# Patient Record
Sex: FEMALE | Race: WHITE | NOT HISPANIC OR LATINO | URBAN - METROPOLITAN AREA
[De-identification: names, ages, dates, MRNs, and addresses within clinical notes are randomized per-mention and may not be internally consistent; named-entity substitution may affect disease eponyms.]

---

## 2021-04-20 ENCOUNTER — TELEPHONE ENCOUNTER (OUTPATIENT)
Dept: URBAN - METROPOLITAN AREA CLINIC 12 | Facility: CLINIC | Age: 76
End: 2021-04-20

## 2021-06-08 ENCOUNTER — OFFICE VISIT (OUTPATIENT)
Dept: URBAN - METROPOLITAN AREA CLINIC 12 | Facility: CLINIC | Age: 76
End: 2021-06-08
Payer: MEDICARE

## 2021-06-08 ENCOUNTER — OFFICE VISIT (OUTPATIENT)
Dept: URBAN - METROPOLITAN AREA CLINIC 12 | Facility: CLINIC | Age: 76
End: 2021-06-08

## 2021-06-08 DIAGNOSIS — F43.9 STRESS: ICD-10-CM

## 2021-06-08 DIAGNOSIS — Z86.010 HISTORY OF ADENOMATOUS POLYP OF COLON: ICD-10-CM

## 2021-06-08 DIAGNOSIS — K57.30 DIVERTICULA OF COLON: ICD-10-CM

## 2021-06-08 DIAGNOSIS — D69.3 CHRONIC ITP (IDIOPATHIC THROMBOCYTOPENIC PURPURA): ICD-10-CM

## 2021-06-08 DIAGNOSIS — K30 ACID INDIGESTION: ICD-10-CM

## 2021-06-08 DIAGNOSIS — D47.2 MGUS (MONOCLONAL GAMMOPATHY OF UNKNOWN SIGNIFICANCE): ICD-10-CM

## 2021-06-08 PROBLEM — 277577000: Status: ACTIVE | Noted: 2021-06-08

## 2021-06-08 PROCEDURE — 99203 OFFICE O/P NEW LOW 30 MIN: CPT | Performed by: INTERNAL MEDICINE

## 2021-06-08 RX ORDER — MAGNESIUM 30 MG
TABLET ORAL
Qty: 0 | Refills: 0 | Status: DISCONTINUED | COMMUNITY
Start: 1900-01-01

## 2021-06-08 RX ORDER — MILK THISTLE 150 MG
CAPSULE ORAL
Qty: 0 | Refills: 0 | Status: DISCONTINUED | COMMUNITY
Start: 1900-01-01

## 2021-06-08 NOTE — HPI-TODAY'S VISIT:
75-year-old  man presents for GI evaluation.   She was last seen in the office in 2017. She has a history of negative colonoscopy on January 27, 2016 except for diverticulosis. Prior to that she did have a history of adenomatous colon polyps.   Patient does see Dr. Zara reis of hematology and also for primary care due to her history of MGUS, ITP and chronically elevated white blood cell count as she has been getting worked up for possible CLL which initially tested negative she states with flow cytometry.   Her last labs were done on June 7, 2021 which showed WBC of 15.3, hemoglobin 12.4 and platelet count at 50. She has had a lot of bruising but no bleeding.  She states that she has also seen a urologist Dr. Morro Suarez For workup of urinary issues including hematuria and pneumaturia. Apparently the workup has been thus far negative. She also sees a rheumatologist for possible arthritis.   Patient does mention that she has had more stress recently obviously during a stressful time during the pandemic in particular.  She previously had indigestion and heartburn in was taking proton-pump inhibitor her symptoms have resolved. She also was treated for H. pylori and did have diarrhea but that has resolved in bowel habits are now normal. H. pylori has been eradicated.  There's been no melena hematochezia or abodminal pain. There has been no fever chills or sweats or nausea vomiting. She did not get the COVID-19 vaccinations.

## 2021-07-02 ENCOUNTER — TELEPHONE ENCOUNTER (OUTPATIENT)
Dept: URBAN - METROPOLITAN AREA CLINIC 12 | Facility: CLINIC | Age: 76
End: 2021-07-02

## 2021-08-30 ENCOUNTER — TELEPHONE ENCOUNTER (OUTPATIENT)
Dept: URBAN - METROPOLITAN AREA CLINIC 12 | Facility: CLINIC | Age: 76
End: 2021-08-30

## 2021-11-12 ENCOUNTER — OFFICE VISIT (OUTPATIENT)
Dept: URBAN - METROPOLITAN AREA CLINIC 12 | Facility: CLINIC | Age: 76
End: 2021-11-12
Payer: MEDICARE

## 2021-11-12 ENCOUNTER — DASHBOARD ENCOUNTERS (OUTPATIENT)
Age: 76
End: 2021-11-12

## 2021-11-12 DIAGNOSIS — K30 ACID INDIGESTION: ICD-10-CM

## 2021-11-12 DIAGNOSIS — D69.3 CHRONIC ITP (IDIOPATHIC THROMBOCYTOPENIC PURPURA): ICD-10-CM

## 2021-11-12 DIAGNOSIS — Z86.010 HISTORY OF ADENOMATOUS POLYP OF COLON: ICD-10-CM

## 2021-11-12 DIAGNOSIS — F43.9 STRESS: ICD-10-CM

## 2021-11-12 DIAGNOSIS — K57.30 DIVERTICULA OF COLON: ICD-10-CM

## 2021-11-12 DIAGNOSIS — Z86.19 HISTORY OF HELICOBACTER PYLORI INFECTION: ICD-10-CM

## 2021-11-12 DIAGNOSIS — R63.4 WEIGHT LOSS: ICD-10-CM

## 2021-11-12 PROBLEM — 733657002: Status: ACTIVE | Noted: 2021-06-08

## 2021-11-12 PROBLEM — 73595000: Status: ACTIVE | Noted: 2021-06-08

## 2021-11-12 PROBLEM — 15627741000119108: Status: ACTIVE | Noted: 2021-11-12

## 2021-11-12 PROBLEM — 13172003: Status: ACTIVE | Noted: 2021-06-08

## 2021-11-12 PROCEDURE — 99214 OFFICE O/P EST MOD 30 MIN: CPT | Performed by: INTERNAL MEDICINE

## 2021-11-12 RX ORDER — SODIUM, POTASSIUM,MAG SULFATES 17.5-3.13G
344MLL AS DIRECTED SOLUTION, RECONSTITUTED, ORAL ORAL
Qty: 1 KIT | Refills: 0 | OUTPATIENT
Start: 2021-11-12 | End: 2021-11-14

## 2021-11-12 NOTE — HPI-TODAY'S VISIT:
This is a 76-year-old woman presents for evaluation. She sees Dr. Noemi Bernal for primary care and Dr. Zara Reis for hematology. She was last seen here in the clinic on June 8, 2021. At that point her platelet count was quite low and she was advised to have a CT colonography to evaluate for possible polyp. 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   She states that she has been under lot of stress and has had weight loss. There has been no abdominal pain. There's been no fever chills or sweats. There's been no change in bowel habits. She states that sometimes when she is stressed she will have some changes by other times when she is not stressed bowel habits are completely normal. This been a majority of her adult lifetime. There's been no pruritus or jaundice. There has been no melena or hematochezia. There's been no nausea or vomiting. There's been no chest pain or shortness of breath or palpitations. She did not want to get a CT colonography done that was ordered in June of 2021 as she was having a bunch of other issues at that time.  She also states that she feels she would like to have an H. pylori rechecked as she does have a history of H. pylori in the past. Of note it was treated and then confirmed to be eradicated on follow up testing.  Patient recently had a bone marrow biopsy by her hematologist Dr. Dania reis. Patient stated she was told she may have CLL. She apparently no longer has MGUS.    Patient weight was 126.8 pounds here in the clinic in June of 2021. Today she is at 125 pounds.  She previously had problems with acid indigestion. States that has actually improved and only takes Pepcid on rare occasion. There's been no dysphagia or odynophagia. No early satiety.  Patient does have a history of adenomatous colon polyp noted in the past on colonoscopy and also has history of diverticulosis.  She was advised to have COVID-19 vaccination when I last saw her but she has not done this yet.  She is on prednisone 5 mg per day right now through her hematologist. I have reviewed her recent laboratory tests which were collected on November 1, 2021. These showed WBC 8.4, hemoglobin 14.8, platelets 131.

## 2021-11-23 LAB — H. PYLORI STOOL AG, EIA: NEGATIVE

## 2021-12-27 PROBLEM — 162031009: Status: ACTIVE | Noted: 2021-06-08

## 2021-12-27 PROBLEM — 429047008: Status: ACTIVE | Noted: 2021-06-08

## 2022-02-11 ENCOUNTER — TELEPHONE ENCOUNTER (OUTPATIENT)
Dept: URBAN - METROPOLITAN AREA CLINIC 12 | Facility: CLINIC | Age: 77
End: 2022-02-11

## 2022-02-21 ENCOUNTER — OFFICE VISIT (OUTPATIENT)
Dept: URBAN - METROPOLITAN AREA LAB 3 | Facility: LAB | Age: 77
End: 2022-02-21

## 2022-02-21 ENCOUNTER — CLAIMS CREATED FROM THE CLAIM WINDOW (OUTPATIENT)
Dept: URBAN - METROPOLITAN AREA LAB 3 | Facility: LAB | Age: 77
End: 2022-02-21
Payer: MEDICARE

## 2022-02-21 ENCOUNTER — CLAIMS CREATED FROM THE CLAIM WINDOW (OUTPATIENT)
Dept: URBAN - METROPOLITAN AREA LAB 3 | Facility: LAB | Age: 77
End: 2022-02-21

## 2022-02-21 DIAGNOSIS — R12 BURNING REFLUX: ICD-10-CM

## 2022-02-21 DIAGNOSIS — K31.89 ACQUIRED DEFORMITY OF DUODENUM: ICD-10-CM

## 2022-02-21 DIAGNOSIS — R11.0 AM NAUSEA: ICD-10-CM

## 2022-02-21 DIAGNOSIS — Z86.010 ADENOMAS PERSONAL HISTORY OF COLONIC POLYPS: ICD-10-CM

## 2022-02-21 DIAGNOSIS — D12.3 ADENOMA OF TRANSVERSE COLON: ICD-10-CM

## 2022-02-21 PROCEDURE — 45385 COLONOSCOPY W/LESION REMOVAL: CPT | Performed by: INTERNAL MEDICINE

## 2022-02-21 PROCEDURE — 43239 EGD BIOPSY SINGLE/MULTIPLE: CPT | Performed by: INTERNAL MEDICINE

## 2022-03-06 ENCOUNTER — WEB ENCOUNTER (OUTPATIENT)
Dept: URBAN - METROPOLITAN AREA CLINIC 12 | Facility: CLINIC | Age: 77
End: 2022-03-06

## 2022-03-08 ENCOUNTER — WEB ENCOUNTER (OUTPATIENT)
Dept: URBAN - METROPOLITAN AREA CLINIC 12 | Facility: CLINIC | Age: 77
End: 2022-03-08

## 2022-03-10 ENCOUNTER — WEB ENCOUNTER (OUTPATIENT)
Dept: URBAN - METROPOLITAN AREA CLINIC 12 | Facility: CLINIC | Age: 77
End: 2022-03-10

## 2025-07-07 ENCOUNTER — OFFICE VISIT (OUTPATIENT)
Dept: URBAN - METROPOLITAN AREA CLINIC 12 | Facility: CLINIC | Age: 80
End: 2025-07-07

## 2025-07-29 ENCOUNTER — OFFICE VISIT (OUTPATIENT)
Dept: URBAN - METROPOLITAN AREA CLINIC 12 | Facility: CLINIC | Age: 80
End: 2025-07-29
Payer: MEDICARE

## 2025-07-29 DIAGNOSIS — R12 HEARTBURN: ICD-10-CM

## 2025-07-29 DIAGNOSIS — M15.9 GENERALIZED OSTEOARTHRITIS: ICD-10-CM

## 2025-07-29 DIAGNOSIS — D69.3 CHRONIC ITP (IDIOPATHIC THROMBOCYTOPENIC PURPURA): ICD-10-CM

## 2025-07-29 DIAGNOSIS — R19.4 CHANGE IN BOWEL HABITS: ICD-10-CM

## 2025-07-29 DIAGNOSIS — Z86.0101 HX OF ADENOMATOUS COLONIC POLYPS: ICD-10-CM

## 2025-07-29 DIAGNOSIS — F41.9 ANXIETY: ICD-10-CM

## 2025-07-29 PROBLEM — 48694002: Status: ACTIVE | Noted: 2025-07-29

## 2025-07-29 PROBLEM — 88111009: Status: ACTIVE | Noted: 2025-07-29

## 2025-07-29 PROBLEM — 429047008: Status: ACTIVE | Noted: 2025-07-29

## 2025-07-29 PROCEDURE — 99203 OFFICE O/P NEW LOW 30 MIN: CPT | Performed by: INTERNAL MEDICINE

## 2025-07-29 RX ORDER — PREDNISONE 2.5 MG/1
1 TABLET WITH FOOD OR MILK TABLET ORAL ONCE A DAY
Status: ACTIVE | COMMUNITY

## 2025-07-29 NOTE — HPI-TODAY'S VISIT:
79F states that she has no change in bowel habits. Eats fiber . Has stress and has problems with constipation. She does have chornic left knee pain and is haivng LKR in August 2025. She has reflux and is better with Pepcid prn.   She has ITP and states that she has anziety. She had negative EGD in 2022. She had 10mm hep flex TA removed in 2022. States that prednisone 1mg-2.5mg range keeps her platelets in the normal range. No recatl bleeding. No dysphaiga. No odynopagiga. No early satiety. No weight loss. No nausea or vomiting. She sees Dr. Sims for primary care and Dr. Zara Avila for hematology

## 2025-08-10 ENCOUNTER — WEB ENCOUNTER (OUTPATIENT)
Dept: URBAN - METROPOLITAN AREA CLINIC 12 | Facility: CLINIC | Age: 80
End: 2025-08-10

## 2025-08-11 ENCOUNTER — WEB ENCOUNTER (OUTPATIENT)
Dept: URBAN - METROPOLITAN AREA CLINIC 12 | Facility: CLINIC | Age: 80
End: 2025-08-11